# Patient Record
Sex: MALE | URBAN - NONMETROPOLITAN AREA
[De-identification: names, ages, dates, MRNs, and addresses within clinical notes are randomized per-mention and may not be internally consistent; named-entity substitution may affect disease eponyms.]

---

## 2017-08-02 ENCOUNTER — APPOINTMENT (OUTPATIENT)
Age: 82
Setting detail: DERMATOLOGY
End: 2017-08-02

## 2017-08-02 DIAGNOSIS — L85.3 XEROSIS CUTIS: ICD-10-CM

## 2017-08-02 DIAGNOSIS — D18.0 HEMANGIOMA: ICD-10-CM

## 2017-08-02 DIAGNOSIS — L81.4 OTHER MELANIN HYPERPIGMENTATION: ICD-10-CM

## 2017-08-02 DIAGNOSIS — L82.1 OTHER SEBORRHEIC KERATOSIS: ICD-10-CM

## 2017-08-02 PROBLEM — D18.01 HEMANGIOMA OF SKIN AND SUBCUTANEOUS TISSUE: Status: ACTIVE | Noted: 2017-08-02

## 2017-08-02 PROCEDURE — OTHER REASSURANCE: OTHER

## 2017-08-02 PROCEDURE — OTHER PRESCRIPTION: OTHER

## 2017-08-02 PROCEDURE — OTHER MEDICATION COUNSELING: OTHER

## 2017-08-02 PROCEDURE — OTHER COUNSELING: OTHER

## 2017-08-02 PROCEDURE — OTHER PATIENT SPECIFIC COUNSELING: OTHER

## 2017-08-02 PROCEDURE — 99213 OFFICE O/P EST LOW 20 MIN: CPT

## 2017-08-02 RX ORDER — TRIAMCINOLONE ACETONIDE 1 MG/G
CREAM TOPICAL
Qty: 1 | Refills: 0 | Status: ERX | COMMUNITY
Start: 2017-08-02

## 2017-08-02 ASSESSMENT — LOCATION SIMPLE DESCRIPTION DERM
LOCATION SIMPLE: ABDOMEN
LOCATION SIMPLE: CHEST
LOCATION SIMPLE: LEFT CHEEK
LOCATION SIMPLE: RIGHT UPPER BACK
LOCATION SIMPLE: CHEST
LOCATION SIMPLE: RIGHT UPPER ARM
LOCATION SIMPLE: RIGHT UPPER BACK
LOCATION SIMPLE: LEFT UPPER ARM

## 2017-08-02 ASSESSMENT — LOCATION ZONE DERM
LOCATION ZONE: TRUNK
LOCATION ZONE: ARM
LOCATION ZONE: TRUNK
LOCATION ZONE: FACE

## 2017-08-02 ASSESSMENT — LOCATION DETAILED DESCRIPTION DERM
LOCATION DETAILED: RIGHT MID-UPPER BACK
LOCATION DETAILED: RIGHT PROXIMAL POSTERIOR UPPER ARM
LOCATION DETAILED: LEFT SUPERIOR MEDIAL BUCCAL CHEEK
LOCATION DETAILED: LEFT DISTAL POSTERIOR UPPER ARM
LOCATION DETAILED: RIGHT SUPERIOR MEDIAL UPPER BACK
LOCATION DETAILED: EPIGASTRIC SKIN
LOCATION DETAILED: LEFT ANTERIOR DISTAL UPPER ARM
LOCATION DETAILED: RIGHT SUPERIOR MEDIAL UPPER BACK
LOCATION DETAILED: RIGHT ANTERIOR DISTAL UPPER ARM
LOCATION DETAILED: LEFT MEDIAL SUPERIOR CHEST
LOCATION DETAILED: LEFT PROXIMAL POSTERIOR UPPER ARM
LOCATION DETAILED: LEFT INFERIOR CENTRAL MALAR CHEEK
LOCATION DETAILED: STERNUM
LOCATION DETAILED: RIGHT DISTAL POSTERIOR UPPER ARM
LOCATION DETAILED: RIGHT ANTERIOR PROXIMAL UPPER ARM
LOCATION DETAILED: RIGHT MEDIAL UPPER BACK

## 2017-08-02 NOTE — HPI: SKIN LESION
How Severe Is Your Skin Lesion?: mild
Has Your Skin Lesion Been Treated?: been treated
Is This A New Presentation, Or A Follow-Up?: Skin Lesions
Additional History: Scratching til bleeding
Is This A New Presentation, Or A Follow-Up?: Skin Lesion

## 2018-08-24 ENCOUNTER — HOSPITAL ENCOUNTER (OUTPATIENT)
Dept: HOSPITAL 59 - ER | Age: 83
Setting detail: OBSERVATION
LOS: 3 days | Discharge: NURSING, REHAB OR LONG-TERM CARE | End: 2018-08-27
Attending: INTERNAL MEDICINE | Admitting: INTERNAL MEDICINE
Payer: MEDICARE

## 2018-08-24 DIAGNOSIS — K21.9: ICD-10-CM

## 2018-08-24 DIAGNOSIS — J18.9: ICD-10-CM

## 2018-08-24 DIAGNOSIS — I10: ICD-10-CM

## 2018-08-24 DIAGNOSIS — Z86.73: ICD-10-CM

## 2018-08-24 DIAGNOSIS — Z66: ICD-10-CM

## 2018-08-24 DIAGNOSIS — J44.9: ICD-10-CM

## 2018-08-24 DIAGNOSIS — F03.90: ICD-10-CM

## 2018-08-24 DIAGNOSIS — I25.2: ICD-10-CM

## 2018-08-24 DIAGNOSIS — R07.9: ICD-10-CM

## 2018-08-24 DIAGNOSIS — Z85.01: ICD-10-CM

## 2018-08-24 DIAGNOSIS — Z95.5: ICD-10-CM

## 2018-08-24 DIAGNOSIS — Z87.891: ICD-10-CM

## 2018-08-24 DIAGNOSIS — N28.9: ICD-10-CM

## 2018-08-24 DIAGNOSIS — I25.10: ICD-10-CM

## 2018-08-24 DIAGNOSIS — I50.9: Primary | ICD-10-CM

## 2018-08-24 DIAGNOSIS — Z95.1: ICD-10-CM

## 2018-08-24 LAB
ANION GAP SERPL CALC-SCNC: 9 MMOL/L (ref 7–16)
APTT BLD: 40.5 SECONDS (ref 24.5–39.1)
BASOPHILS NFR BLD: 0.3 % (ref 0–6)
BUN SERPL-MCNC: 24 MG/DL (ref 8–23)
CK MB SERPL-MCNC: 1.7 NG/ML (ref ?–6.73)
CK MB SERPL-MCNC: 2 NG/ML (ref ?–6.73)
CK MB SERPL-MCNC: 2.5 NG/ML (ref ?–6.73)
CO2 SERPL-SCNC: 31 MMOL/L (ref 22–29)
CREAT SERPL-MCNC: 1.3 MG/DL (ref 0.7–1.2)
CREATINE PHOSPHOKINASE: 31 U/L (ref 39–308)
EOSINOPHIL NFR BLD: 3.1 % (ref 0–6)
ERYTHROCYTE [DISTWIDTH] IN BLOOD BY AUTOMATED COUNT: 14.7 % (ref 11.5–14.5)
EST GLOMERULAR FILTRATION RATE: 56 ML/MIN
GLUCOSE SERPL-MCNC: 136 MG/DL (ref 74–109)
GRANULOCYTES NFR BLD: 74.3 % (ref 47–80)
HCT VFR BLD CALC: 39.8 % (ref 42–52)
HGB BLD-MCNC: 12.1 GM/DL (ref 14–18)
INR PPP: 1.5
LYMPHOCYTES NFR BLD AUTO: 14.8 % (ref 16–45)
MCH RBC QN AUTO: 24.6 PG (ref 27–33)
MCHC RBC AUTO-ENTMCNC: 30.4 G/DL (ref 32–36)
MCV RBC AUTO: 81.2 FL (ref 81–97)
MONOCYTES NFR BLD: 7.5 % (ref 0–9)
PLATELET # BLD: 168 K/UL (ref 130–400)
PMV BLD AUTO: 10.2 FL (ref 7.4–10.4)
PROTHROMBIN TIME: 14.5 SECONDS (ref 9.5–12.1)
RBC # BLD AUTO: 4.9 M/UL (ref 4.4–5.7)
WBC # BLD AUTO: 7.1 K/UL (ref 4.2–12.2)

## 2018-08-24 PROCEDURE — 96374 THER/PROPH/DIAG INJ IV PUSH: CPT

## 2018-08-24 PROCEDURE — 80048 BASIC METABOLIC PNL TOTAL CA: CPT

## 2018-08-24 PROCEDURE — 99220: CPT

## 2018-08-24 PROCEDURE — 71046 X-RAY EXAM CHEST 2 VIEWS: CPT

## 2018-08-24 PROCEDURE — 85025 COMPLETE CBC W/AUTO DIFF WBC: CPT

## 2018-08-24 PROCEDURE — 99285 EMERGENCY DEPT VISIT HI MDM: CPT

## 2018-08-24 PROCEDURE — 99226: CPT

## 2018-08-24 PROCEDURE — 99217: CPT

## 2018-08-24 PROCEDURE — 83880 ASSAY OF NATRIURETIC PEPTIDE: CPT

## 2018-08-24 PROCEDURE — 94760 N-INVAS EAR/PLS OXIMETRY 1: CPT

## 2018-08-24 PROCEDURE — 93010 ELECTROCARDIOGRAM REPORT: CPT

## 2018-08-24 PROCEDURE — 94640 AIRWAY INHALATION TREATMENT: CPT

## 2018-08-24 PROCEDURE — 84484 ASSAY OF TROPONIN QUANT: CPT

## 2018-08-24 PROCEDURE — 85730 THROMBOPLASTIN TIME PARTIAL: CPT

## 2018-08-24 PROCEDURE — 93005 ELECTROCARDIOGRAM TRACING: CPT

## 2018-08-24 PROCEDURE — 82553 CREATINE MB FRACTION: CPT

## 2018-08-24 PROCEDURE — 82550 ASSAY OF CK (CPK): CPT

## 2018-08-24 PROCEDURE — 94761 N-INVAS EAR/PLS OXIMETRY MLT: CPT

## 2018-08-24 PROCEDURE — 85610 PROTHROMBIN TIME: CPT

## 2018-08-24 RX ADMIN — CARVEDILOL SCH MG: 3.12 TABLET, FILM COATED ORAL at 09:36

## 2018-08-24 RX ADMIN — DONEPEZIL HYDROCHLORIDE SCH MG: 5 TABLET, FILM COATED ORAL at 09:36

## 2018-08-24 RX ADMIN — FUROSEMIDE SCH MG: 10 INJECTION, SOLUTION INTRAMUSCULAR; INTRAVENOUS at 09:36

## 2018-08-24 RX ADMIN — ATORVASTATIN CALCIUM SCH MG: 20 TABLET, FILM COATED ORAL at 22:07

## 2018-08-24 RX ADMIN — ISOSORBIDE MONONITRATE SCH MG: 30 TABLET, FILM COATED, EXTENDED RELEASE ORAL at 09:35

## 2018-08-24 RX ADMIN — ASPIRIN TAB DELAYED RELEASE 81 MG SCH MG: 81 TABLET DELAYED RESPONSE at 09:35

## 2018-08-24 RX ADMIN — AMLODIPINE BESYLATE SCH MG: 5 TABLET ORAL at 09:35

## 2018-08-24 RX ADMIN — RIVAROXABAN SCH MG: 20 TABLET, FILM COATED ORAL at 09:35

## 2018-08-24 RX ADMIN — LISINOPRIL SCH MG: 20 TABLET ORAL at 09:36

## 2018-08-24 RX ADMIN — CARVEDILOL SCH MG: 3.12 TABLET, FILM COATED ORAL at 22:06

## 2018-08-24 RX ADMIN — MEMANTINE HYDROCHLORIDE SCH MG: 10 TABLET ORAL at 09:35

## 2018-08-24 RX ADMIN — ACETAMINOPHEN PRN MG: 500 TABLET ORAL at 22:08

## 2018-08-24 RX ADMIN — FUROSEMIDE SCH MG: 10 INJECTION, SOLUTION INTRAMUSCULAR; INTRAVENOUS at 16:12

## 2018-08-24 RX ADMIN — PANTOPRAZOLE SODIUM SCH MG: 40 TABLET, DELAYED RELEASE ORAL at 06:56

## 2018-08-24 RX ADMIN — MEMANTINE HYDROCHLORIDE SCH MG: 10 TABLET ORAL at 22:08

## 2018-08-24 RX ADMIN — CARVEDILOL SCH MG: 12.5 TABLET, FILM COATED ORAL at 09:36

## 2018-08-24 NOTE — EMERGENCY DEPARTMENT RECORD
History of Present Illness





- General


Chief Complaint: Cough


Stated Complaint: COUGH/CHEST PAIN


Time Seen by Provider: 18 01:08


Source: Patient


Mode of Arrival: Ambulatory


Limitations: No limitations





- History of Present Illness


Initial Comments: 


The patient is here due to a one week hx of cough and congestion. Now this 

evening he started having CP only when coughing. He denies any new SOB, fever, 

or sputum production. The patient had been to [facility] about a month ago for 

similar issues and was diagnosed with mild CHF.





MD Complaint: Cough


Onset/Timin


-: Week(s)





- Related Data


 Home Medications











 Medication  Instructions  Recorded  Confirmed  Last Taken


 


Amlodipine Besylate [Norvasc] 2.5 mg PO DAILY 18 Unknown


 


Aspirin [Aspir-Low] 81 mg PO DAILY 18 Unknown


 


Atorvastatin Calcium [Lipitor] 10 mg PO QHS 18 Unknown


 


Carvedilol 25 mg PO DAILY 18 Unknown


 


Carvedilol [Coreg] 6.25 mg PO BID 18 Unknown


 


Donepezil HCl [Aricept] 5 mg PO DAILY 18 Unknown


 


Furosemide [Lasix] 20 mg PO DAILY 18 Unknown


 


Isosorbide Mononitrate [Imdur] 30 mg PO DAILY 18 Unknown


 


Lisinopril 20 mg PO DAILY 18 Unknown


 


Memantine HCl [Namenda] 10 mg PO BID 18 Unknown


 


Multivit-Min/FA/Lycopen/Lutein 1 each PO DAILY 18 Unknown





[Centrum Silver Men Tablet]    


 


Omeprazole 20 mg PO DAILY 18 Unknown


 


Rivaroxaban [Xarelto] 20 mg PO DAILY 18 Unknown


 


Tramadol HCl 50 mg PO Q6H PRN 18 Unknown











 Allergies











Allergy/AdvReac Type Severity Reaction Status Date / Time


 


Cephalosporins Allergy  pt unsure Verified 18 01:29


 


clonidine Allergy  RASH Verified 18 01:29


 


lovastatin Allergy  pt unsure Verified 18 01:29


 


oxaprozin AdvReac  fall asleep Verified 18 01:29


 


rofecoxib [From Vioxx] AdvReac  leg pain Verified 18 01:29


 


simvastatin [From Zocor] AdvReac  myalgia Verified 18 01:29














Travel Screening





- Travel/Exposure Within Last 30 Days


Have you traveled within the last 30 days?: No





Review of Systems


Constitutional: Reports: Malaise.  Denies: Chills, Fever


Eyes: Denies: Eye discharge


ENT: Reports: Congestion


Respiratory: Reports: Cough.  Denies: Dyspnea


Cardiovascular: Reports: Chest pain (with cough only.).  Denies: Arrhythmia


Endocrine: Reports: Fatigue


Gastrointestinal: Denies: Abdominal pain


Genitourinary: Denies: Dysuria


Musculoskeletal: Denies: Arthralgia, Back pain


Skin: Denies: Bruising





Past Medical History





- SOCIAL HISTORY


Smoking Status: Never smoker


Alcohol Use: None


Drug Use: None





- RESPIRATORY


Hx Respiratory Disorders: Yes


Hx COPD: Yes





- CARDIOVASCULAR


Hx Cardio Disorders: Yes


Hx CHF: Yes


Hx Heart Attack: Yes





- NEURO


Hx Neuro Disorders: Yes


Hx Dementia: Yes





- GI


Hx GI Disorders: Yes


Hx Reflux: Yes





- 


Hx Genitourinary Disorders: No





- ENDOCRINE


Hx Endocrine Disorders: No





- MUSCULOSKELETAL


Hx Musculoskeletal Disorders: Yes


Hx Arthritis: Yes





- PSYCH


Hx Psych Problems: No





- HEMATOLOGY/ONCOLOGY


Hx Hematology/Oncology Disorders: Yes


Hx Cancer: Yes (Esophageal)


Hx Chemotherapy: No


Hx Radiation Therapy: No





Family Medical History


Any Significant Family History?: No





Physical Exam





- General


General Appearance: Alert, Oriented x3, Cooperative, No acute distress





- Head


Head exam: Atraumatic, Normocephalic, Normal inspection





- Eye


Eye exam: Normal appearance





- ENT


Throat exam: Normal inspection.  negative: Tonsillar erythema, Tonsillar exudate





- Neck


Neck exam: Normal inspection, Full ROM.  negative: Tenderness





- Respiratory


Respiratory exam: Normal lung sounds bilaterally.  negative: Respiratory 

distress





- Cardiovascular


Cardiovascular Exam: Regular rate, Normal rhythm, Normal heart sounds





- GI/Abdominal


GI/Abdominal exam: Soft, Normal bowel sounds.  negative: Tenderness





- Extremities


Extremities exam: Normal inspection, Full ROM, Normal capillary refill.  

negative: Tenderness





- Back


Back exam: Reports: Normal inspection





- Neurological


Neurological exam: Alert, Oriented X3.  negative: Altered, Motor sensory deficit





- Skin


Skin exam: negative: Rash





Course





 Vital Signs











  18





  00:45


 


Temperature 97.6 F


 


Pulse Rate 76


 


Respiratory 20





Rate 


 


Blood Pressure 188/86


 


Pulse Ox 97














- Reevaluation(s)


Reevaluation #1: 


The patient is doing OK at this time and is still coughing. His CXR clearly 

appears to be CHF and his BNP was only about 1000 last month and now is over 

5000. We will admit him to the hospital for diuresis and also further cardiac 

monitoring. I again asked him about his CP and he states he ONLY has pain when 

coughing.


18 01:49





Reevaluation #2: 


The patient is doing very well at this time. He is resting comfortably in no 

distress or RAYRAY now.


18 02:26








Medical Decision Making





- Data Complexity


MDM Data: Labs Ordered and/or Reviewed, X-Ray Ordered and/or Reviewed, EKG 

Ordered and/or Reviewed





- Lab Data


Result diagrams: 


 18 00:34





 18 00:34





- EKG Data


-: EKG Interpreted by Me (NSR at 76, LBBB.)


EKG: Unchanged From Previous (No change from 3/3/18.)





- Radiology Data


Radiology results: Report reviewed (CXR: CHF with effusions.)





Disposition


Disposition: Admit


Clinical Impression: 


CHF (congestive heart failure)


Qualifiers:


 Heart failure type: unspecified Heart failure chronicity: unspecified 

Qualified Code(s): I50.9 - Heart failure, unspecified





Disposition: Still a Patient at [facility]


Decision to Admit: Admit from ER


Decision to Admit Date: 18


Decision to Admit Time: 01:51


Accepting Physician: Jose David Marques Discussed w/Accepting Physician: 01:51


Condition: (2) Stable


Time of Disposition: 01:52





Quality





- Quality Measures


Quality Measures: N/A





- Blood Pressure Screening


View Details: Yes


Does Patient Have Any of the Following: Active Dx of HTN


Blood Pressure Classification: Pre-Hypertensive BP Reading


Systolic Measurement: 188


Diastolic Measurement: 86


Screening for High Blood Pressure: Patient Exclusion, Hx of HTN []

## 2018-08-24 NOTE — RADIOLOGY REPORT
EXAM:  CHEST, TWO VIEWS



HISTORY:  DIFFICULTY IN BREATHING. 



TECHNIQUE:  Frontal and lateral views of the chest were performed.  



FINDINGS:  The heart size is normal.  Postop surgical change.  The lungs are 
hyperinflated.  Bilateral lower lobe air space opacities with pleural effusion.
  



IMPRESSION:  

BILATERAL LOWER LOBE AIR SPACE OPACITIES WITH PLEURAL EFFUSION.  



JOB NUMBER:  [id number]
MTDD

## 2018-08-24 NOTE — HISTORY & PHYSICAL
History of Present Illness





- Date of Service


Date of Service for History & Physical: 08/24/18





- History of Present Illness


Admitting Diagnosis: 1. CHF


History of Present Illness: 


Mr. Edouard is a 84 y/o male [facility] assisted living resident with history 

of congestive heart failure who presents with complaint of increasing shortness 

of breath and persistent cough over the past week. The patient reports that he 

then began to have chest pain but only when he coughs. He says that he becomes 

short of breath when he climbs stairs but can walk around using his walker 

without becoming winded. The patient reports compliance with his medications 

and has not been eating excessive amounts of salty foods or had an increase in 

fluid intake. He denies radiation of his pain or palpitations. He has a history 

of coronary artery disease with CABG x 3, CVA, seizures and malignant neoplasm 

of the esophagus. 





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 

PACs throughout. Chest xray shows evidence of congestion and his BNP > 5000. 

The patient is admitted for IV diuresis and optimization of heart failure 

medication. 








PCP: Dr. Michael  





Travel Screening





- Travel/Exposure Within Last 30 Days


Have you traveled within the last 30 days?: No





- Travel/Exposure Within Last Year


Have you traveled outside the [place] in the last year?: No





- Additonal Travel Details


Have you been exposed to anyone with a communicable illness?: No





- Travel Symptoms


Symptom Screening: None





Review of Systems


Constitutional: Reports: Malaise.  Denies: Chills, Fever


Eyes: Denies: Eye discharge


ENT: Reports: Congestion


Respiratory: Reports: Cough.  Denies: Dyspnea


Cardiovascular: Reports: Chest pain (with cough only.).  Denies: Arrhythmia


Endocrine: Reports: Fatigue


Gastrointestinal: Denies: Abdominal pain


Genitourinary: Denies: Dysuria


Musculoskeletal: Denies: Arthralgia, Back pain


Skin: Denies: Bruising





Past Medical History





- SOCIAL HISTORY


Smoking Status: Former smoker


Alcohol Use: Rare


Drug Use: None





- RESPIRATORY


Hx Respiratory Disorders: Yes


Hx Asthma: No


Hx Bronchitis: No


Hx COPD: No


Hx Dyspnea: Yes


Hx Pneumonia: Yes


Hx Pulmonary Embolism: No


Hx Sleep Apnea: Yes


Hx Tuberculosis: No


Hx of CPAP: Yes





- CARDIOVASCULAR


Hx Cardio Disorders: Yes


Hx Abnormal EKG: Yes


Hx Cardiac Cath: Yes


Hx Chest Pain: No


Hx CHF: Yes


Hx Deep Vein Thrombosis: No


Hx Edema: No


Hx Heart Attack: Yes


Hx Hypertension: Yes


Hx Hypotension: No


Hx Irregular Heartbeat: Yes


Hx Palpitations: No


Hx Pacemaker/Defib: No


Hx Vascular Disease: No





- NEURO


Hx Neuro Disorders: Yes


Hx Brain Tumor: No


Hx CVA: Yes (2017)


Hx Dementia: Yes


Hx Dizziness: Yes


Hx Headaches: No


Hx Neuropathy: No


Hx Parkinson's Disease: No


Hx Seizures: No


Hx Speech Problem: No


Hx TIA: Yes





- GI


Hx GI Disorders: Yes


Hx Abdominal Pain: No


Hx Celiac Disease: No


Hx Crohn's Disease: No


Hx Diverticulitis: No


Hx GI Bleed: No


Hx Reflux: Yes


Hx Hepatitis/Jaundice: No


Hx Hiatal Hernia: Yes


Hx Irritable Bowel: Yes


Hx Liver Disease: No


Hx Nausea/Vomiting: No


Hx Obstructive Bowel: No


Hx Pancreatitis: No


Hx Rectal Bleeding: No


Hx Ulcer: Yes


Hx Wt Loss/Wt Gain: No


Hx of Polyps: No





- 


Hx Genitourinary Disorders: No


Hx Bladder Problem: No


Hx Dialysis: No


Hx Kidney Stones: No


Hx Prostate Problems: No


Hx Renal Disease: No


Hx UTI: No





- ENDOCRINE


Hx Endocrine Disorders: No


Hx Diabetes: No


Hx Thyroid Disease: No





- MUSCULOSKELETAL


Hx Musculoskeletal Disorders: Yes


Hx Arthritis: Yes


Hx Back Injury: No


Hx Fibromyalgia: No


Hx Gout: No


Hx Musculoskeletal Disease: No


Hx Osteoporosis: No





- PSYCH


Hx Psych Problems: No


Hx Anxiety: No


Hx Behavior Problems: No


Hx Depression: No


Hx Emotional Abuse: No


Hx Sexual Abuse: No


Hx Suicide Attempt: No


Major Depressive Episode: No


Feelings of Hopelessness: No





- HEMATOLOGY/ONCOLOGY


Hx Hematology/Oncology Disorders: Yes


Hx Anemia: No


Hx Blood Disorders: No


Hx Bruising: No


Hx Cancer: Yes (Esophageal)


Hx Chemotherapy: No


Hx Radiation Therapy: No


Hx Clotting Problems: No


Hx Sickle Cell Disease: No


Hx Unexplained Bleeding: No


Hx Blood Transfusions: No


Hx Blood Transfusion Reaction: No





Family Medical History


Any Significant Family History?: No


Hx Cancer: Mother, Brother/Sister


Hx Diabetes: Brother/Sister


Hx Heart Disease: Father, Mother, Brother/Sister


Hx HTN: Mother, Brother/Sister


Hx Stroke: Mother





H&P Meds/Allergies





- Allergies


Allergies: 


 Allergies











Allergy/AdvReac Type Severity Reaction Status Date / Time


 


Cephalosporins Allergy  pt unsure Verified 08/24/18 01:29


 


clonidine Allergy  RASH Verified 08/24/18 01:29


 


lovastatin Allergy  pt unsure Verified 08/24/18 01:29


 


oxaprozin AdvReac  fall asleep Verified 08/24/18 01:29


 


rofecoxib [From Vioxx] AdvReac  leg pain Verified 08/24/18 01:29


 


simvastatin [From Zocor] AdvReac  myalgia Verified 08/24/18 01:29














- Home Medications


 Home Medications











 Medication  Instructions  Recorded  Confirmed  Last Taken


 


Amlodipine Besylate [Norvasc] 2.5 mg PO DAILY 08/24/18 08/24/18 Unknown


 


Aspirin [Aspir-Low] 81 mg PO DAILY 08/24/18 08/24/18 Unknown


 


Atorvastatin Calcium [Lipitor] 10 mg PO QHS 08/24/18 08/24/18 Unknown


 


Carvedilol 25 mg PO DAILY 08/24/18 08/24/18 Unknown


 


Carvedilol [Coreg] 6.25 mg PO BID 08/24/18 08/24/18 Unknown


 


Donepezil HCl [Aricept] 5 mg PO DAILY 08/24/18 08/24/18 Unknown


 


Furosemide [Lasix] 20 mg PO DAILY 08/24/18 08/24/18 Unknown


 


Isosorbide Mononitrate [Imdur] 30 mg PO DAILY 08/24/18 08/24/18 Unknown


 


Lisinopril 20 mg PO DAILY 08/24/18 08/24/18 Unknown


 


Memantine HCl [Namenda] 10 mg PO BID 08/24/18 08/24/18 Unknown


 


Multivit-Min/FA/Lycopen/Lutein 1 each PO DAILY 08/24/18 08/24/18 Unknown





[Centrum Silver Men Tablet]    


 


Omeprazole 20 mg PO DAILY 08/24/18 08/24/18 Unknown


 


Rivaroxaban [Xarelto] 20 mg PO DAILY 08/24/18 08/24/18 Unknown


 


Tramadol HCl 50 mg PO Q6H PRN 08/24/18 08/24/18 Unknown














- Active Medications


Active Medications: 


 Current Medications





Acetaminophen (Tylenol 500mg Tab)  500 mg PO Q6H PRN


   PRN Reason: PAIN - MILD(1-4)/FEVER


Amlodipine Besylate (Norvasc)  2.5 mg PO DAILY [facility]


Aspirin (Ecotrin (Ec))  81 mg PO DAILY [facility]


Carvedilol (Coreg)  25 mg PO DAILY [facility]


Carvedilol (Coreg)  6.25 mg PO BID [facility]


Donepezil HCl (Aricept)  5 mg PO DAILY [facility]


Furosemide (Lasix Iv)  20 mg IVP BIDDIUR [facility]


Isosorbide Mononitrate (Imdur)  30 mg PO DAILY [facility]


Lisinopril (Zestril)  20 mg PO DAILY [facility]


Memantine (Namenda)  10 mg PO BID [facility]


Nitroglycerin (Nitrostat 0.4mg)  0.4 mg SL Q5MIN PRN


   PRN Reason: CHEST PAIN


Non-Formulary Medication (Atorvastatin Calcium [Lipitor])  10 mg PO QHS [facility]


Pantoprazole Sodium (Protonix)  40 mg PO DAILYAC [facility]


   Last Admin: 08/24/18 06:56 Dose:  40 mg


Rivaroxaban (Xarelto)  20 mg PO DAILY [facility]


Tramadol HCl (Ultram)  50 mg PO Q6H PRN


   PRN Reason: PAIN - MOD TO SEVERE (5-10)











Physical Exam





- Vital Signs


Vital Signs: 


 Vital Signs - Last 24 Hrs











  Temp Pulse Pulse Resp BP BP Pulse Ox


 


 08/24/18 06:00   72   18    96


 


 08/24/18 04:00  97.6 F   66  18   155/72  100


 


 08/24/18 02:39  97.4 F L   74  16   120/64  99


 


 08/24/18 02:30   74   20  144/80   98


 


 08/24/18 01:47    77  24   133/71  98


 


 08/24/18 00:45  97.6 F  76   20  188/86   97














- General


General Appearance: Alert, Oriented x3, Cooperative, No acute distress


Limitations: No limitations





- Head


Head exam: Atraumatic, Normocephalic, Normal inspection





- Eye


Eye exam: Normal appearance





- ENT


Throat exam: Normal inspection.  negative: Tonsillar erythema, Tonsillar exudate





- Neck


Neck exam: Normal inspection, Full ROM.  negative: Tenderness





- Respiratory


Respiratory exam: Normal lung sounds bilaterally, Rales (of bilateral posterior 

lung bases. ), Other.  negative: Respiratory distress





- Cardiovascular


Cardiovascular Exam: Regular rate, Normal rhythm, Normal heart sounds


Peripheral Pulses: 2+: Radial (R), Radial (L), Dorsalis Pedis (R), Dorsalis 

Pedis (L)





- GI/Abdominal


GI/Abdominal exam: Soft, Normal bowel sounds.  negative: Tenderness





- Extremities


Extremities exam: Normal inspection, Full ROM, Normal capillary refill.  

negative: Tenderness





- Back


Back exam: Reports: Normal inspection





- Neurological


Neurological exam: Alert, Oriented X3.  negative: Altered, Motor sensory deficit





- Skin


Skin exam: negative: Rash





Results





- Labs


Result Diagrams: 


 08/24/18 00:34





 08/24/18 00:34


Labs Last 24 Hours: 


 Laboratory Results - last 24 hr











  08/24/18 08/24/18 08/24/18





  00:34 00:34 00:34


 


WBC  7.1  


 


RBC  4.90  


 


Hgb  12.1 L  


 


Hct  39.8 L  


 


MCV  81.2  


 


MCH  24.6 L  


 


MCHC  30.4 L  


 


RDW  14.7 H  


 


Plt Count  168  


 


MPV  10.2  


 


Gran %  74.3  


 


Lymphocytes %  14.8 L  


 


Monocytes %  7.5  


 


Eosinophils %  3.1  


 


Basophils %  0.3  


 


PT   14.5 H 


 


INR   1.5 


 


APTT   40.5 H 


 


Sodium    140


 


Potassium    4.5


 


Chloride    100


 


Carbon Dioxide    31.0 H


 


Anion Gap    9.0


 


BUN    24 H


 


Creatinine    1.3 H


 


Estimated GFR    56


 


Random Glucose    136 H


 


Calcium    8.9


 


Creatine Kinase    31 L


 


CK-MB (CK-2)    2.0


 


Troponin T    < 0.010


 


NT-Pro-B Natriuret Pep    5047.00 H














  08/24/18 08/24/18





  01:15 06:15


 


WBC  


 


RBC  


 


Hgb  


 


Hct  


 


MCV  


 


MCH  


 


MCHC  


 


RDW  


 


Plt Count  


 


MPV  


 


Gran %  


 


Lymphocytes %  


 


Monocytes %  


 


Eosinophils %  


 


Basophils %  


 


PT  


 


INR  


 


APTT  


 


Sodium  


 


Potassium  


 


Chloride  


 


Carbon Dioxide  


 


Anion Gap  


 


BUN  


 


Creatinine  


 


Estimated GFR  


 


Random Glucose  


 


Calcium  


 


Creatine Kinase  


 


CK-MB (CK-2)   2.5


 


Troponin T   < 0.010


 


NT-Pro-B Natriuret Pep  Cancelled 














VTE H&P Assessment





- Risk for VTE


Risk for VTE: Yes


Risk Level: High


Risk Assessment Date: 08/24/18


Risk Assessment Time: 09:57


VTE Orders Placed or Will Be Placed: Yes





Plan





- Detailed Diagnosis and Plan


(1) Acute exacerbation of congestive heart failure


Current Visit: Yes   Status: Acute   Base Code: I50.9 - HEART FAILURE, 

UNSPECIFIED   Comment: 


8/24/18:


- CXR: evidence of congestion, BNP > 5000


- IV Lasix 20mg BID, resume Coreg 25mg and Coreg 6.25mg BID?, Imdur 30mg, and 

Lisinopril 20mg daily,Atorvastatin 10mg, ASA 81mg.


- repeat basic metabolic panel, daily weights, I/Os 


- continuous cardiac monitoring   





(2) HTN (hypertension)


Current Visit: Yes   Status: Acute   Base Code: I10 - ESSENTIAL (PRIMARY) 

HYPERTENSION   Comment: 


8/24/18:


- /72, HR 72.


- resume Amlodipine 2.5mg, Lisinopril 20mg daily.    





(3) CAD (coronary artery disease)


Current Visit: Yes   Status: Acute   Base Code: I25.10 - ATHSCL HEART DISEASE 

OF NATIVE CORONARY ARTERY W/O ANG PCTRS   Comment: 


8/24/18:


- old LBBB, Qtc prolongation, NSR, hx of CABG x 3 


- resumes ASA, Coreg, Lisinopril, Atorvastatin. 


- cont cardiac monitor    





(4) DVT prophylaxis


Current Visit: Yes   Status: Acute   Base Code: [id number] -    Comment: 


8/24/18:


- pt on Xarelto but we are unclear if he has a fib, or hx of dvt. There is no 

documentation of either of these conditions.    





(5) DNR (do not resuscitate)


Current Visit: Yes   Status: Acute   Base Code: Z66 - DO NOT RESUSCITATE   

Comment: 


8/24/18:


- pt is DNR on this admission.   





- Disposition


D/C tomorrow if improved

## 2018-08-25 LAB
ANION GAP SERPL CALC-SCNC: 11 MMOL/L (ref 7–16)
BASOPHILS NFR BLD: 0.3 % (ref 0–6)
BUN SERPL-MCNC: 37 MG/DL (ref 8–23)
CO2 SERPL-SCNC: 30 MMOL/L (ref 22–29)
CREAT SERPL-MCNC: 1.6 MG/DL (ref 0.7–1.2)
EOSINOPHIL NFR BLD: 5.5 % (ref 0–6)
ERYTHROCYTE [DISTWIDTH] IN BLOOD BY AUTOMATED COUNT: 14.8 % (ref 11.5–14.5)
EST GLOMERULAR FILTRATION RATE: 44 ML/MIN
GLUCOSE SERPL-MCNC: 114 MG/DL (ref 74–109)
GRANULOCYTES NFR BLD: 64.8 % (ref 47–80)
HCT VFR BLD CALC: 35.8 % (ref 42–52)
HGB BLD-MCNC: 10.8 GM/DL (ref 14–18)
LYMPHOCYTES NFR BLD AUTO: 19.3 % (ref 16–45)
MCH RBC QN AUTO: 24.3 PG (ref 27–33)
MCHC RBC AUTO-ENTMCNC: 30.2 G/DL (ref 32–36)
MCV RBC AUTO: 80.8 FL (ref 81–97)
MONOCYTES NFR BLD: 10.1 % (ref 0–9)
PLATELET # BLD: 160 K/UL (ref 130–400)
PMV BLD AUTO: 9.6 FL (ref 7.4–10.4)
RBC # BLD AUTO: 4.43 M/UL (ref 4.4–5.7)
WBC # BLD AUTO: 7.4 K/UL (ref 4.2–12.2)

## 2018-08-25 RX ADMIN — CARVEDILOL SCH MG: 3.12 TABLET, FILM COATED ORAL at 22:09

## 2018-08-25 RX ADMIN — CARVEDILOL SCH MG: 3.12 TABLET, FILM COATED ORAL at 09:32

## 2018-08-25 RX ADMIN — FUROSEMIDE SCH: 20 TABLET ORAL at 16:33

## 2018-08-25 RX ADMIN — DONEPEZIL HYDROCHLORIDE SCH MG: 5 TABLET, FILM COATED ORAL at 09:36

## 2018-08-25 RX ADMIN — AMLODIPINE BESYLATE SCH MG: 5 TABLET ORAL at 09:37

## 2018-08-25 RX ADMIN — PANTOPRAZOLE SODIUM SCH MG: 40 TABLET, DELAYED RELEASE ORAL at 08:05

## 2018-08-25 RX ADMIN — MEMANTINE HYDROCHLORIDE SCH MG: 10 TABLET ORAL at 09:32

## 2018-08-25 RX ADMIN — ISOSORBIDE MONONITRATE SCH MG: 30 TABLET, FILM COATED, EXTENDED RELEASE ORAL at 09:36

## 2018-08-25 RX ADMIN — ATORVASTATIN CALCIUM SCH MG: 20 TABLET, FILM COATED ORAL at 22:10

## 2018-08-25 RX ADMIN — ACETAMINOPHEN PRN MG: 500 TABLET ORAL at 22:09

## 2018-08-25 RX ADMIN — ASPIRIN TAB DELAYED RELEASE 81 MG SCH MG: 81 TABLET DELAYED RESPONSE at 09:36

## 2018-08-25 RX ADMIN — LISINOPRIL SCH MG: 20 TABLET ORAL at 09:32

## 2018-08-25 RX ADMIN — RIVAROXABAN SCH MG: 20 TABLET, FILM COATED ORAL at 09:32

## 2018-08-25 RX ADMIN — CARVEDILOL SCH MG: 12.5 TABLET, FILM COATED ORAL at 09:32

## 2018-08-25 RX ADMIN — FUROSEMIDE SCH MG: 10 INJECTION, SOLUTION INTRAMUSCULAR; INTRAVENOUS at 09:37

## 2018-08-25 RX ADMIN — MEMANTINE HYDROCHLORIDE SCH MG: 10 TABLET ORAL at 22:11

## 2018-08-25 NOTE — PHYSICIAN PROGRESS NOTE
Subjective





- Date


Date of Physician Progress Note: 08/25/18





- Subjective


Subjective Comment: 


Patient alert and awake. He is able to get out of bed and ambulate  to restroom 

with assistance. He is maintaining sats > 94% w/o supplemtal oxygen. 





Objective





- Vital Signs


Vital Signs: 





 Vital Signs - Last 24 Hrs











  Temp Pulse Pulse Pulse Resp BP Pulse Ox


 


 08/25/18 09:00  98.9 F   60   20  138/89  97


 


 08/25/18 08:56      16   96


 


 08/25/18 08:26    68   16  


 


 08/25/18 05:00  97.6 F    62  18  136/88  99


 


 08/25/18 01:00  97.9 F    60  18  142/64  100


 


 08/24/18 20:00  97.6 F    70  18  102/58  99


 


 08/24/18 17:00  98.1 F    75  20  116/68  94 L


 


 08/24/18 13:00  98.3 F    69  18  83/45  97


 


 08/24/18 11:20   52 L    20   96














- General


General Appearance: Alert, Oriented x3, Cooperative, No acute distress


Limitations: No limitations





- Head


Head exam: Atraumatic, Normocephalic, Normal inspection





- Eye


Eye exam: Normal appearance





- ENT


Throat exam: Normal inspection.  negative: Tonsillar erythema, Tonsillar exudate





- Neck


Neck exam: Normal inspection, Full ROM.  negative: Tenderness





- Respiratory


Respiratory exam: Decreased breath sounds (on the left ), Rales (no rales this 

morning ), Other.  negative: Respiratory distress





- Cardiovascular


Cardiovascular Exam: Regular rate, Normal rhythm, Normal heart sounds


Peripheral Pulses: 2+: Radial (R), Radial (L), Dorsalis Pedis (R), Dorsalis 

Pedis (L)





- GI/Abdominal


GI/Abdominal exam: Soft, Normal bowel sounds.  negative: Tenderness





- Extremities


Extremities exam: Normal inspection, Full ROM, Normal capillary refill.  

negative: Tenderness





- Back


Back exam: Reports: Normal inspection





- Neurological


Neurological exam: Alert, Oriented X3.  negative: Altered, Motor sensory deficit





- Skin


Skin exam: negative: Rash





Assessment and Plan





- Assessment and Plan


(1) Acute on chronic renal insufficiency


Current Visit: Yes   Status: Acute   Base Code: N28.9 - DISORDER OF KIDNEY AND 

URETER, UNSPECIFIED; N18.9 - CHRONIC KIDNEY DISEASE, UNSPECIFIED   Comment: 


8/25/18:


- Bun/Cr 37/1.6, possibly due to diuresis. 


- Repeat electrolytes in the morning


- Lasix 20mg BID to PO BID daily.    





(2) Acute exacerbation of congestive heart failure


Current Visit: Yes   Status: Acute   Base Code: I50.9 - HEART FAILURE, 

UNSPECIFIED   Comment: 


8/25/18:


- CXR: evidence of congestion, BNP > 5000


- IV Lasix 20mg BID, resume Coreg 25mg and Coreg 6.25mg BID?, Imdur 30mg, and 

Lisinopril 20mg daily,Atorvastatin 10mg, ASA 81mg.


- repeat basic metabolic panel, daily weights, I/Os 


- continuous cardiac monitoring   





(3) HTN (hypertension)


Current Visit: Yes   Status: Acute   Base Code: I10 - ESSENTIAL (PRIMARY) 

HYPERTENSION   Comment: 


8/25/18:


- /88, HR 68


- resume Amlodipine 2.5mg, Lisinopril 20mg daily.    





(4) CAD (coronary artery disease)


Current Visit: Yes   Status: Acute   Base Code: I25.10 - ATHSCL HEART DISEASE 

OF NATIVE CORONARY ARTERY W/O ANG PCTRS   Comment: 


8/25/18:


- old LBBB, Qtc prolongation, NSR, hx of CABG x 3 


- resumes ASA, Coreg, Lisinopril, Atorvastatin. 


- cont cardiac monitor    





(5) DVT prophylaxis


Current Visit: Yes   Status: Acute   Base Code: NTL4212 -    Comment: 


8/25/18:


- pt on Xarelto but we are unclear if he has a fib, or hx of dvt. There is no 

documentation of either of these conditions.    





(6) DNR (do not resuscitate)


Current Visit: Yes   Status: Acute   Base Code: Z66 - DO NOT RESUSCITATE   

Comment: 


8/25/18:


- pt is DNR on this admission.   





- Disposition


Disposition: 


D/C back to [facility] living. Follow up with Dr. Michael within the 

week. 





Results





- Labs


Result Diagrams: 


 08/25/18 05:55





 08/25/18 05:55


Labs Last 24 Hours: 





 Laboratory Results - last 24 hr











  08/24/18 08/25/18 08/25/18





  14:12 05:55 05:55


 


WBC   7.4 


 


RBC   4.43 


 


Hgb   10.8 L 


 


Hct   35.8 L 


 


MCV   80.8 L 


 


MCH   24.3 L 


 


MCHC   30.2 L 


 


RDW   14.8 H 


 


Plt Count   160 


 


MPV   9.6 


 


Gran %   64.8 


 


Lymphocytes %   19.3 


 


Monocytes %   10.1 H 


 


Eosinophils %   5.5 


 


Basophils %   0.3 


 


Sodium    139


 


Potassium    4.2


 


Chloride    98


 


Carbon Dioxide    30.0 H


 


Anion Gap    11.0


 


BUN    37 H


 


Creatinine    1.6 H


 


Estimated GFR    44


 


Random Glucose    114 H


 


Calcium    8.9


 


CK-MB (CK-2)  1.7  


 


Troponin T  0.015 H  














DVT/PE Assessment





- Risk for VTE


Risk for VTE: No


Risk Level: High


Risk Assessment Date: 08/24/18


Risk Assessment Time: 09:57


VTE Orders Placed or Will Be Placed: Yes





- Active Medicaitons


Current Medications: 





 Current Medications





Acetaminophen (Tylenol 500mg Tab)  500 mg PO Q6H PRN


   PRN Reason: PAIN - MILD(1-4)/FEVER


   Last Admin: 08/24/18 22:08 Dose:  500 mg


Amlodipine Besylate (Norvasc)  2.5 mg PO DAILY [facility]


   Last Admin: 08/25/18 09:37 Dose:  2.5 mg


Aspirin (Ecotrin (Ec))  81 mg PO DAILY [facility]


   Last Admin: 08/25/18 09:36 Dose:  81 mg


Atorvastatin Calcium (Lipitor)  10 mg PO QHS [facility]


   Last Admin: 08/24/18 22:07 Dose:  10 mg


Carvedilol (Coreg)  25 mg PO DAILY [facility]


   Last Admin: 08/25/18 09:32 Dose:  25 mg


Carvedilol (Coreg)  6.25 mg PO BID [facility]


   Last Admin: 08/25/18 09:32 Dose:  6.25 mg


Donepezil HCl (Aricept)  5 mg PO DAILY [facility]


   Last Admin: 08/25/18 09:36 Dose:  5 mg


Furosemide (Lasix Iv)  20 mg IVP BIDDIUR [facility]


   Last Admin: 08/25/18 09:37 Dose:  20 mg


Isosorbide Mononitrate (Imdur)  30 mg PO DAILY [facility]


   Last Admin: 08/25/18 09:36 Dose:  30 mg


Lisinopril (Zestril)  20 mg PO DAILY [facility]


   Last Admin: 08/25/18 09:32 Dose:  20 mg


Memantine (Namenda)  10 mg PO BID [facility]


   Last Admin: 08/25/18 09:32 Dose:  10 mg


Nitroglycerin (Nitrostat 0.4mg)  0.4 mg SL Q5MIN PRN


   PRN Reason: CHEST PAIN


Pantoprazole Sodium (Protonix)  40 mg PO DAILYAC [facility]


   Last Admin: 08/25/18 08:05 Dose:  40 mg


Rivaroxaban (Xarelto)  20 mg PO DAILY [facility]


   Last Admin: 08/25/18 09:32 Dose:  20 mg


Tramadol HCl (Ultram)  50 mg PO Q6H PRN


   PRN Reason: PAIN - MOD TO SEVERE (5-10)











AMI Plan





- Labs


Result Diagrams: 


 08/25/18 05:55





 08/25/18 05:55

## 2018-08-26 LAB
ANION GAP SERPL CALC-SCNC: 10 MMOL/L (ref 7–16)
BUN SERPL-MCNC: 40 MG/DL (ref 8–23)
CO2 SERPL-SCNC: 30 MMOL/L (ref 22–29)
CREAT SERPL-MCNC: 1.5 MG/DL (ref 0.7–1.2)
EST GLOMERULAR FILTRATION RATE: 48 ML/MIN
GLUCOSE SERPL-MCNC: 121 MG/DL (ref 74–109)

## 2018-08-26 RX ADMIN — ASPIRIN TAB DELAYED RELEASE 81 MG SCH MG: 81 TABLET DELAYED RESPONSE at 09:08

## 2018-08-26 RX ADMIN — CARVEDILOL SCH MG: 3.12 TABLET, FILM COATED ORAL at 21:57

## 2018-08-26 RX ADMIN — CARVEDILOL SCH MG: 12.5 TABLET, FILM COATED ORAL at 09:08

## 2018-08-26 RX ADMIN — AMLODIPINE BESYLATE SCH: 5 TABLET ORAL at 13:10

## 2018-08-26 RX ADMIN — ALBUTEROL SULFATE PRN MG: 2.5 SOLUTION RESPIRATORY (INHALATION) at 10:06

## 2018-08-26 RX ADMIN — AZITHROMYCIN MONOHYDRATE SCH: 500 INJECTION, POWDER, LYOPHILIZED, FOR SOLUTION INTRAVENOUS at 13:10

## 2018-08-26 RX ADMIN — FUROSEMIDE SCH MG: 20 TABLET ORAL at 16:35

## 2018-08-26 RX ADMIN — CEFTRIAXONE SODIUM SCH MLS/HR: 1 INJECTION, POWDER, FOR SOLUTION INTRAMUSCULAR; INTRAVENOUS at 13:10

## 2018-08-26 RX ADMIN — ATORVASTATIN CALCIUM SCH MG: 20 TABLET, FILM COATED ORAL at 21:56

## 2018-08-26 RX ADMIN — DONEPEZIL HYDROCHLORIDE SCH MG: 5 TABLET, FILM COATED ORAL at 09:08

## 2018-08-26 RX ADMIN — CARVEDILOL SCH MG: 3.12 TABLET, FILM COATED ORAL at 09:08

## 2018-08-26 RX ADMIN — ISOSORBIDE MONONITRATE SCH: 30 TABLET, FILM COATED, EXTENDED RELEASE ORAL at 13:10

## 2018-08-26 RX ADMIN — RIVAROXABAN SCH MG: 20 TABLET, FILM COATED ORAL at 09:08

## 2018-08-26 RX ADMIN — ALBUTEROL SULFATE PRN MG: 2.5 SOLUTION RESPIRATORY (INHALATION) at 16:03

## 2018-08-26 RX ADMIN — LISINOPRIL SCH MG: 20 TABLET ORAL at 09:08

## 2018-08-26 RX ADMIN — MEMANTINE HYDROCHLORIDE SCH MG: 10 TABLET ORAL at 21:55

## 2018-08-26 RX ADMIN — PANTOPRAZOLE SODIUM SCH MG: 40 TABLET, DELAYED RELEASE ORAL at 06:50

## 2018-08-26 RX ADMIN — ACETAMINOPHEN PRN MG: 500 TABLET ORAL at 21:56

## 2018-08-26 RX ADMIN — AZITHROMYCIN SCH MG: 500 TABLET, FILM COATED ORAL at 13:07

## 2018-08-26 RX ADMIN — MEMANTINE HYDROCHLORIDE SCH MG: 10 TABLET ORAL at 09:08

## 2018-08-26 RX ADMIN — FUROSEMIDE SCH MG: 20 TABLET ORAL at 09:08

## 2018-08-26 NOTE — PHYSICIAN PROGRESS NOTE
Subjective





- Date


Date of Physician Progress Note: 08/26/18





- Subjective


Subjective Comment: 


Patient alert and awake. He is able to get out of bed and ambulate  to restroom 

with assistance. He is maintaining sats > 94% w/o supplemtal oxygen. He has a 

persistent wet cough but is not bringing up any sputum. He denies chest pain or 

shortness of breath. 





Objective





- Vital Signs


Vital Signs: 





 Vital Signs - Last 24 Hrs











  Temp Pulse Pulse Pulse Resp BP BP


 


 08/26/18 10:06   74    18  


 


 08/26/18 09:00     65  18   110/46


 


 08/26/18 08:26    68   20  


 


 08/26/18 07:30  97.8 F    62  20   155/63


 


 08/26/18 05:00  98.1 F    55 L  20  137/53 


 


 08/26/18 00:10  97.6 F    64  20  149/66 


 


 08/25/18 21:00    68    


 


 08/25/18 17:00  99 F   68   20  126/59 


 


 08/25/18 13:00  99.1 F   64  64  24  110/56 














  Pulse Ox


 


 08/26/18 10:06  98


 


 08/26/18 09:00  97


 


 08/26/18 08:26 


 


 08/26/18 07:30  98


 


 08/26/18 05:00  97


 


 08/26/18 00:10  98


 


 08/25/18 21:00 


 


 08/25/18 17:00  95


 


 08/25/18 13:00  93 L














- General


General Appearance: Alert, Oriented x3, Cooperative, No acute distress


Limitations: No limitations





- Head


Head exam: Atraumatic, Normocephalic, Normal inspection





- Eye


Eye exam: Normal appearance





- ENT


Throat exam: Normal inspection.  negative: Tonsillar erythema, Tonsillar exudate





- Neck


Neck exam: Normal inspection, Full ROM.  negative: Tenderness





- Respiratory


Respiratory exam: Decreased breath sounds (on the left ), Rales (no rales this 

morning ), Other.  negative: Respiratory distress





- Cardiovascular


Cardiovascular Exam: Regular rate, Normal rhythm, Normal heart sounds


Peripheral Pulses: 2+: Radial (R), Radial (L), Dorsalis Pedis (R), Dorsalis 

Pedis (L)





- GI/Abdominal


GI/Abdominal exam: Soft, Normal bowel sounds.  negative: Tenderness





- Extremities


Extremities exam: Normal inspection, Full ROM, Normal capillary refill.  

negative: Tenderness





- Back


Back exam: Reports: Normal inspection





- Neurological


Neurological exam: Alert, Oriented X3.  negative: Altered, Motor sensory deficit





- Skin


Skin exam: negative: Rash





Assessment and Plan





- Assessment and Plan


(1) Pneumonia of both lower lobes


Current Visit: Yes   Status: Acute   Base Code: J18.9 - PNEUMONIA, UNSPECIFIED 

ORGANISM   Comment: 





8/26/18: 


- CXR 8/25: opacities of bilateral lower lung lobes. 


- Recephin 1 gm Q24H, Zithromax 500mg Q24H 


- Albuterol nebs Q6H ORN


- Oxygen to maintain sats > 92%    





(2) Acute exacerbation of congestive heart failure


Current Visit: Yes   Status: Acute   Base Code: I50.9 - HEART FAILURE, 

UNSPECIFIED   Comment: 


8/26/18:


- CXR: evidence of congestion, BNP > 5000


- IV Lasix 20mg BID, resume Coreg 25mg and Coreg 6.25mg BID?, Imdur 30mg held, 

and Lisinopril 20mg daily,Atorvastatin 10mg, ASA 81mg.


- repeat basic metabolic panel, daily weights, I/Os 


- continuous cardiac monitoring   





(3) Acute on chronic renal insufficiency


Current Visit: Yes   Status: Acute   Base Code: N28.9 - DISORDER OF KIDNEY AND 

URETER, UNSPECIFIED; N18.9 - CHRONIC KIDNEY DISEASE, UNSPECIFIED   Comment: 


8/26/18:


- Bun/Cr 37/1.6, --> 40/1.5 


- Repeat electrolytes in the morning


- Lasix 20mg BID to PO BID daily.    





(4) HTN (hypertension)


Current Visit: Yes   Status: Acute   Base Code: I10 - ESSENTIAL (PRIMARY) 

HYPERTENSION   Comment: 


8/26/18:


- /46


- resume Amlodipine 2.5mg, Lisinopril 20mg daily. Hold BP medications.    





(5) CAD (coronary artery disease)


Current Visit: Yes   Status: Acute   Base Code: I25.10 - ATHSCL HEART DISEASE 

OF NATIVE CORONARY ARTERY W/O ANG PCTRS   Comment: 


8/26/18:


- old LBBB, Qtc prolongation, NSR, hx of CABG x 3 


- resumes ASA, Coreg, Lisinopril, Atorvastatin. 


- cont cardiac monitor    





(6) DVT prophylaxis


Current Visit: Yes   Status: Acute   Base Code: [id number] -    Comment: 


8/26/18:


- pt on Xarelto but we are unclear if he has a fib, or hx of dvt. There is no 

documentation of either of these conditions.    





(7) DNR (do not resuscitate)


Current Visit: Yes   Status: Acute   Base Code: Z66 - DO NOT RESUSCITATE   

Comment: 


8/26/18:


- pt is DNR on this admission.   





Results





- Labs


Result Diagrams: 


 08/25/18 05:55





 08/26/18 06:10


Labs Last 24 Hours: 





 Laboratory Results - last 24 hr











  08/26/18





  06:10


 


Sodium  140


 


Potassium  4.4


 


Chloride  100


 


Carbon Dioxide  30.0 H


 


Anion Gap  10.0


 


BUN  40 H


 


Creatinine  1.5 H


 


Estimated GFR  48


 


Random Glucose  121 H


 


Calcium  8.8














DVT/PE Assessment





- Risk for VTE


Risk for VTE: No


Risk Level: High


Risk Assessment Date: 08/24/18


Risk Assessment Time: 09:57


VTE Orders Placed or Will Be Placed: Yes





- Active Medicaitons


Current Medications: 





 Current Medications





Acetaminophen (Tylenol 500mg Tab)  500 mg PO Q6H PRN


   PRN Reason: PAIN - MILD(1-4)/FEVER


   Last Admin: 08/25/18 22:09 Dose:  500 mg


Albuterol Sulfate ()  2.5 mg INH RESP.Q6H.WA PRN


   PRN Reason: DIFFICULTY IN BREATHING


   Last Admin: 08/26/18 10:06 Dose:  2.5 mg


Amlodipine Besylate (Norvasc)  2.5 mg PO DAILY [facility]


   Last Admin: 08/25/18 09:37 Dose:  2.5 mg


Aspirin (Ecotrin (Ec))  81 mg PO DAILY [facility]


   Last Admin: 08/26/18 09:08 Dose:  81 mg


Atorvastatin Calcium (Lipitor)  10 mg PO QHS [facility]


   Last Admin: 08/25/18 22:10 Dose:  10 mg


Carvedilol (Coreg)  25 mg PO DAILY [facility]


   Last Admin: 08/26/18 09:08 Dose:  25 mg


Carvedilol (Coreg)  6.25 mg PO BID [facility]


   Last Admin: 08/26/18 09:08 Dose:  6.25 mg


Donepezil HCl (Aricept)  5 mg PO DAILY [facility]


   Last Admin: 08/26/18 09:08 Dose:  5 mg


Furosemide (Lasix)  20 mg PO BIDDIUR [facility]


   Last Admin: 08/26/18 09:08 Dose:  20 mg


Azithromycin 500 mg/ Sodium (Chloride)  250 mls @ 250 mls/hr IVPB Q24H [facility]


   Stop: 08/31/18 11:01


Ceftriaxone Sodium 1 gm/ (Sodium Chloride)  100 mls @ 200 mls/hr IVPB Q24H [facility]


   Stop: 08/31/18 10:01


Isosorbide Mononitrate (Imdur)  30 mg PO DAILY [facility]


   Last Admin: 08/25/18 09:36 Dose:  30 mg


Lisinopril (Zestril)  20 mg PO DAILY [facility]


   Last Admin: 08/26/18 09:08 Dose:  20 mg


Memantine (Namenda)  10 mg PO BID [facility]


   Last Admin: 08/26/18 09:08 Dose:  10 mg


Nitroglycerin (Nitrostat 0.4mg)  0.4 mg SL Q5MIN PRN


   PRN Reason: CHEST PAIN


[facility]


   Last Admin: 08/26/18 06:50 Dose:  40 mg


Rivaroxaban (Xarelto)  20 mg PO DAILY [facility]


   Last Admin: 08/26/18 09:08 Dose:  20 mg


Tramadol HCl (Ultram)  50 mg PO Q6H PRN


   PRN Reason: PAIN - MOD TO SEVERE (5-10)











AMI Plan





- Labs


Result Diagrams: 


 08/25/18 05:55





 08/26/18 06:10

## 2018-08-26 NOTE — DISCHARGE SUMMARY
Providers


Date of admission: 


08/24/18 02:23





Attending physician: 


MARIANGEL FAIRBANKS





Primary care physician: 


Yossi Michael








Physical Exam





- Vital Signs


Vital Signs: 


 Vital Signs - Last 24 Hrs











  Temp Pulse Pulse Resp BP BP Pulse Ox


 


 08/26/18 09:00    65  18   110/46  97


 


 08/26/18 08:26   68   20   


 


 08/26/18 07:30  97.8 F   62  20   155/63  98


 


 08/26/18 05:00  98.1 F   55 L  20  137/53   97


 


 08/26/18 00:10  97.6 F   64  20  149/66   98


 


 08/25/18 21:00   68     


 


 08/25/18 17:00  99 F  68   20  126/59   95


 


 08/25/18 13:00  99.1 F  64  64  24  110/56   93 L


 


 08/25/18 10:42     18    92 L


 


 08/25/18 10:36     18    96














- General


General Appearance: Alert, Oriented x3, Cooperative, No acute distress


Limitations: No limitations





- Head


Head exam: Atraumatic, Normocephalic, Normal inspection





- Eye


Eye exam: Normal appearance





- ENT


Throat exam: Normal inspection.  negative: Tonsillar erythema, Tonsillar exudate





- Neck


Neck exam: Normal inspection, Full ROM.  negative: Tenderness





- Respiratory


Respiratory exam: Decreased breath sounds (on the left ), Rales (no rales this 

morning ), Other.  negative: Respiratory distress





- Cardiovascular


Cardiovascular Exam: Regular rate, Normal rhythm, Normal heart sounds


Peripheral Pulses: 2+: Radial (R), Radial (L), Dorsalis Pedis (R), Dorsalis 

Pedis (L)





- GI/Abdominal


GI/Abdominal exam: Soft, Normal bowel sounds.  negative: Tenderness





- Extremities


Extremities exam: Normal inspection, Full ROM, Normal capillary refill.  

negative: Tenderness





- Back


Back exam: Reports: Normal inspection





- Neurological


Neurological exam: Alert, Oriented X3.  negative: Altered, Motor sensory deficit





- Skin


Skin exam: negative: Rash





Hospitalization





- Hospitalization


Admission Diagnosis: 1. CHF





- Problem List/Discharge Diagnosis


(1) Acute on chronic renal insufficiency


Current Visit: Yes   Status: Acute   Base Code: N28.9 - DISORDER OF KIDNEY AND 

URETER, UNSPECIFIED; N18.9 - CHRONIC KIDNEY DISEASE, UNSPECIFIED   Comment: 


8/25/18:


- Bun/Cr 37/1.6, possibly due to diuresis. 


- Repeat electrolytes in the morning


- Lasix 20mg BID to PO BID daily.    





(2) Acute exacerbation of congestive heart failure


Current Visit: Yes   Status: Acute   Base Code: I50.9 - HEART FAILURE, 

UNSPECIFIED   Comment: 


8/25/18:


- CXR: evidence of congestion, BNP > 5000


- IV Lasix 20mg BID, resume Coreg 25mg and Coreg 6.25mg BID?, Imdur 30mg, and 

Lisinopril 20mg daily,Atorvastatin 10mg, ASA 81mg.


- repeat basic metabolic panel, daily weights, I/Os 


- continuous cardiac monitoring   





(3) HTN (hypertension)


Current Visit: Yes   Status: Acute   Base Code: I10 - ESSENTIAL (PRIMARY) 

HYPERTENSION   Comment: 


8/25/18:


- /88, HR 68


- resume Amlodipine 2.5mg, Lisinopril 20mg daily.    





(4) CAD (coronary artery disease)


Current Visit: Yes   Status: Acute   Base Code: I25.10 - ATHSCL HEART DISEASE 

OF NATIVE CORONARY ARTERY W/O ANG PCTRS   Comment: 


8/25/18:


- old LBBB, Qtc prolongation, NSR, hx of CABG x 3 


- resumes ASA, Coreg, Lisinopril, Atorvastatin. 


- cont cardiac monitor    





(5) DVT prophylaxis


Current Visit: Yes   Status: Acute   Base Code: [id number] -    Comment: 


8/25/18:


- pt on Xarelto but we are unclear if he has a fib, or hx of dvt. There is no 

documentation of either of these conditions.    





(6) DNR (do not resuscitate)


Current Visit: Yes   Status: Acute   Base Code: Z66 - DO NOT RESUSCITATE   

Comment: 


8/25/18:


- pt is DNR on this admission.   





- Disposition


D/C back to [facility] assisted [facility]. Follow up with Dr. Michael within the 

week. 





- Hospitalization Course


Procedures: 


Imaging and X-Rays





08/24/18 01:12


CHEST 2 VIEWS [RAD] Stat 





08/25/18 16:25


CHEST 2 VIEWS [RAD] Stat 








Cardiology Procedures





08/24/18 01:12


Cardiac Monitor NOW 


EKG NOW 





08/24/18 02:39


Cardiac Monitor .Continuous 


EKG QDX2@0600 











Abnormal Labs: 


 Abnormal Lab Results











  08/24/18 08/24/18 08/24/18 Range/Units





  00:34 00:34 00:34 


 


Hgb  12.1 L    (14.0-18.0)  gm/dl


 


Hct  39.8 L    (42.0-52.0)  %


 


MCV     (81-97)  fl


 


MCH  24.6 L    (27-33)  pg


 


MCHC  30.4 L    (32-36)  g/dl


 


RDW  14.7 H    (11.5-14.5)  %


 


Lymphocytes %  14.8 L    (16-45)  %


 


Monocytes %     (0-9)  %


 


PT   14.5 H   (9.5-12.1)  SECONDS


 


APTT   40.5 H   (24.5-39.1)  SECONDS


 


Carbon Dioxide    31.0 H  (22-29)  mmol/L


 


BUN    24 H  (8-23)  mg/dL


 


Creatinine    1.3 H  (0.7-1.2)  mg/dL


 


Random Glucose    136 H  ()  mg/dL


 


Creatine Kinase    31 L  ()  U/L


 


Troponin T     (0-0.010)  ng/mL


 


NT-Pro-B Natriuret Pep    5047.00 H  (<450)  pg/mL














  08/24/18 08/25/18 08/25/18 Range/Units





  14:12 05:55 05:55 


 


Hgb   10.8 L   (14.0-18.0)  gm/dl


 


Hct   35.8 L   (42.0-52.0)  %


 


MCV   80.8 L   (81-97)  fl


 


MCH   24.3 L   (27-33)  pg


 


MCHC   30.2 L   (32-36)  g/dl


 


RDW   14.8 H   (11.5-14.5)  %


 


Lymphocytes %     (16-45)  %


 


Monocytes %   10.1 H   (0-9)  %


 


PT     (9.5-12.1)  SECONDS


 


APTT     (24.5-39.1)  SECONDS


 


Carbon Dioxide    30.0 H  (22-29)  mmol/L


 


BUN    37 H  (8-23)  mg/dL


 


Creatinine    1.6 H  (0.7-1.2)  mg/dL


 


Random Glucose    114 H  ()  mg/dL


 


Creatine Kinase     ()  U/L


 


Troponin T  0.015 H    (0-0.010)  ng/mL


 


NT-Pro-B Natriuret Pep     (<450)  pg/mL














  08/26/18 Range/Units





  06:10 


 


Hgb   (14.0-18.0)  gm/dl


 


Hct   (42.0-52.0)  %


 


MCV   (81-97)  fl


 


MCH   (27-33)  pg


 


MCHC   (32-36)  g/dl


 


RDW   (11.5-14.5)  %


 


Lymphocytes %   (16-45)  %


 


Monocytes %   (0-9)  %


 


PT   (9.5-12.1)  SECONDS


 


APTT   (24.5-39.1)  SECONDS


 


Carbon Dioxide  30.0 H  (22-29)  mmol/L


 


BUN  40 H  (8-23)  mg/dL


 


Creatinine  1.5 H  (0.7-1.2)  mg/dL


 


Random Glucose  121 H  ()  mg/dL


 


Creatine Kinase   ()  U/L


 


Troponin T   (0-0.010)  ng/mL


 


NT-Pro-B Natriuret Pep   (<450)  pg/mL











Condition at Discharge: (2) Stable





Discharge Medications





- Discharge Medications


Home Medications: 


 Ambulatory Orders





Amlodipine Besylate [Norvasc] 2.5 mg PO DAILY 08/24/18 [Last Taken Unknown]


Aspirin [Aspir-Low] 81 mg PO DAILY 08/24/18 [Last Taken Unknown]


Atorvastatin Calcium [Lipitor] 10 mg PO QHS 08/24/18 [Last Taken Unknown]


Carvedilol 25 mg PO DAILY 08/24/18 [Last Taken Unknown]


Carvedilol [Coreg] 6.25 mg PO BID 08/24/18 [Last Taken Unknown]


Donepezil HCl [Aricept] 5 mg PO DAILY 08/24/18 [Last Taken Unknown]


Furosemide [Lasix] 20 mg PO DAILY 08/24/18 [Last Taken Unknown]


Isosorbide Mononitrate [Imdur] 30 mg PO DAILY 08/24/18 [Last Taken Unknown]


Lisinopril 20 mg PO DAILY 08/24/18 [Last Taken Unknown]


Memantine HCl [Namenda] 10 mg PO BID 08/24/18 [Last Taken Unknown]


Multivit-Min/FA/Lycopen/Lutein [Centrum Silver Men Tablet] 1 each PO DAILY 08/24 /18 [Last Taken Unknown]


Omeprazole 20 mg PO DAILY 08/24/18 [Last Taken Unknown]


Rivaroxaban [Xarelto] 20 mg PO DAILY 08/24/18 [Last Taken Unknown]


Tramadol HCl 50 mg PO Q6H PRN 08/24/18 [Last Taken Unknown]











Discharge Plan





- Discharge Instructions


Instructions:  Heart Failure (ED)





Quality Measures





- Quality Measures


Quality Measures: Advance Directives, Coronary Artery Disease: Antiplatelet 

Therapy, Documentation of Current Medications in Medical Record, Elder 

Maltreatment Screen and Follow-Up Plan, Heart Failure, Screening for High Blood 

Pressure and F/U Documented





- Current Medications


Quality Measure: Measure #130: Documentation of Current Medications





- Blood Pressure Screening


Quality Measure: Screening for High Blood Pressure and Follow-Up Documented


Blood Pressure Classification: Pre-Hypertensive BP Reading


Systolic Measurement: 144


Diastolic Measurement: 80


Screening for High Blood Pressure: < Pre-Hypertensive BP, F/U Documented > [

]





- Coronary Artery Disease


Quality Measure: Measure #6: Coronary Artery Disease (CAD)





- Heart Failure (ACE/ARB Therapy)


Quality Measure: Heart Failure





- Heart Failure (Beta-blocker Therapy)


Quality Measure: Heart Failure





- Advance Directives


Quality Measure: Measure #47: Care Plan


Advance Directives Established: No


Advance Directives Information Provided To Patient: No


Advance Directives on File: No


Living Will: No (unknown)


Power of : No (unknown)





- Elder Abuse Suspicion Index


Screening: Elder Abuse Suspicion Index Screening


Rely on people for bathing, dressing, shopping, banking, etc: Yes


Prevented from getting food, clothes, medication, etc: No


Made to feel shamed or threatened by someone: No


Forced to sign papers or use money against will: No


Feel afraid, touched in ways not wanted or hurt physically: No


Poor eye contact, withdrawn, malnourished, cuts or bruises: No


Screening Result: Negative result


E[employer]I Reference Information: Nando ROBERTSON, Heather C, Turner D, Ruddy GU.Development and validation of a tool to assist physicians identification of 

elder abuse: The Elder Abuse Suspicion  Index (EASI ).  Journal of Elder Abuse 

and Neglect, 2008; 20 (3): 276-300.





- Elder Maltreatment Screen


Quality Measures: Elder Maltreatment Screen and Follow-Up Plan


Elder Maltreatment Screen: <Negative, No Follow-Up Plan Required> []

## 2018-08-27 LAB
ANION GAP SERPL CALC-SCNC: 11 MMOL/L (ref 7–16)
BASOPHILS NFR BLD: 0.4 % (ref 0–6)
BUN SERPL-MCNC: 31 MG/DL (ref 8–23)
CO2 SERPL-SCNC: 30 MMOL/L (ref 22–29)
CREAT SERPL-MCNC: 1.4 MG/DL (ref 0.7–1.2)
EOSINOPHIL NFR BLD: 2.8 % (ref 0–6)
ERYTHROCYTE [DISTWIDTH] IN BLOOD BY AUTOMATED COUNT: 14.8 % (ref 11.5–14.5)
EST GLOMERULAR FILTRATION RATE: 51 ML/MIN
GLUCOSE SERPL-MCNC: 114 MG/DL (ref 74–109)
GRANULOCYTES NFR BLD: 77.7 % (ref 47–80)
HCT VFR BLD CALC: 38 % (ref 42–52)
HGB BLD-MCNC: 11.4 GM/DL (ref 14–18)
LYMPHOCYTES NFR BLD AUTO: 10.8 % (ref 16–45)
MCH RBC QN AUTO: 24.3 PG (ref 27–33)
MCHC RBC AUTO-ENTMCNC: 30 G/DL (ref 32–36)
MCV RBC AUTO: 81.2 FL (ref 81–97)
MONOCYTES NFR BLD: 8.3 % (ref 0–9)
PLATELET # BLD: 180 K/UL (ref 130–400)
PMV BLD AUTO: 10 FL (ref 7.4–10.4)
RBC # BLD AUTO: 4.68 M/UL (ref 4.4–5.7)
WBC # BLD AUTO: 11.2 K/UL (ref 4.2–12.2)

## 2018-08-27 RX ADMIN — MEMANTINE HYDROCHLORIDE SCH MG: 10 TABLET ORAL at 10:06

## 2018-08-27 RX ADMIN — CARVEDILOL SCH: 12.5 TABLET, FILM COATED ORAL at 10:08

## 2018-08-27 RX ADMIN — CARVEDILOL SCH: 3.12 TABLET, FILM COATED ORAL at 10:08

## 2018-08-27 RX ADMIN — LISINOPRIL SCH MG: 20 TABLET ORAL at 10:06

## 2018-08-27 RX ADMIN — PANTOPRAZOLE SODIUM SCH MG: 40 TABLET, DELAYED RELEASE ORAL at 07:00

## 2018-08-27 RX ADMIN — AZITHROMYCIN SCH MG: 500 TABLET, FILM COATED ORAL at 10:07

## 2018-08-27 RX ADMIN — FUROSEMIDE SCH MG: 20 TABLET ORAL at 10:06

## 2018-08-27 RX ADMIN — DONEPEZIL HYDROCHLORIDE SCH MG: 5 TABLET, FILM COATED ORAL at 10:07

## 2018-08-27 RX ADMIN — AZITHROMYCIN MONOHYDRATE SCH MLS/HR: 500 INJECTION, POWDER, LYOPHILIZED, FOR SOLUTION INTRAVENOUS at 11:52

## 2018-08-27 RX ADMIN — ASPIRIN TAB DELAYED RELEASE 81 MG SCH MG: 81 TABLET DELAYED RESPONSE at 10:07

## 2018-08-27 RX ADMIN — RIVAROXABAN SCH MG: 20 TABLET, FILM COATED ORAL at 10:07

## 2018-08-27 RX ADMIN — AMLODIPINE BESYLATE SCH: 5 TABLET ORAL at 10:07

## 2018-08-27 RX ADMIN — CEFTRIAXONE SODIUM SCH MLS/HR: 1 INJECTION, POWDER, FOR SOLUTION INTRAMUSCULAR; INTRAVENOUS at 09:57

## 2018-08-27 RX ADMIN — ISOSORBIDE MONONITRATE SCH: 30 TABLET, FILM COATED, EXTENDED RELEASE ORAL at 10:08

## 2018-08-27 NOTE — DISCHARGE SUMMARY
Providers


Discharge Summary Date: 08/27/18


Date of admission: 


08/24/18 02:23





Attending physician: 


MARIANGEL FAIRBANKS





Primary care physician: 


Yossi Michael








Physical Exam





- Vital Signs


Vital Signs: 


 Vital Signs - Last 24 Hrs











  Temp Pulse Pulse Resp BP BP Pulse Ox


 


 08/27/18 09:00  98.8 F   67  20   162/78  97


 


 08/27/18 05:00  98.0 F   70  18  144/70   95


 


 08/26/18 20:00  98.1 F   66  20   143/71  98


 


 08/26/18 17:00  98.4 F   67  18   133/66  97


 


 08/26/18 16:07   60   18    95


 


 08/26/18 16:03   61   18    98


 


 08/26/18 13:00  98.4 F   68  18   121/63  97














- General


General Appearance: Alert, Oriented x3, Cooperative, No acute distress


Limitations: No limitations





- Head


Head exam: Atraumatic, Normocephalic, Normal inspection





- Eye


Eye exam: Normal appearance





- ENT


Throat exam: Normal inspection.  negative: Tonsillar erythema, Tonsillar exudate





- Neck


Neck exam: Normal inspection, Full ROM.  negative: Tenderness





- Respiratory


Respiratory exam: Decreased breath sounds (on the left ), Rales (no rales this 

morning ), Other (patient has upper airway bronchial sounds. ).  negative: 

Respiratory distress





- Cardiovascular


Cardiovascular Exam: Regular rate, Normal rhythm, Normal heart sounds


Peripheral Pulses: 2+: Radial (R), Radial (L), Dorsalis Pedis (R), Dorsalis 

Pedis (L)





- GI/Abdominal


GI/Abdominal exam: Soft, Normal bowel sounds.  negative: Tenderness





- Extremities


Extremities exam: Normal inspection, Full ROM, Normal capillary refill.  

negative: Tenderness





- Back


Back exam: Reports: Normal inspection





- Neurological


Neurological exam: Alert, Oriented X3.  negative: Altered, Motor sensory deficit





- Skin


Skin exam: negative: Rash





Hospitalization





- Hospitalization


Admission Diagnosis: 1. CHF





- Problem List/Discharge Diagnosis


(1) Pneumonia of both lower lobes


Current Visit: Yes   Status: Acute   Base Code: J18.9 - PNEUMONIA, UNSPECIFIED 

ORGANISM   Comment: 





8/27/18: 


- CXR 8/25: opacities of bilateral lower lung lobes. 


- Recephin 1 gm Q24H, Zithromax 500mg Q24H, WBCs stable, afebrile. 


- Change to Cefdinir 300mg Q12H and Zithromax 500mg Q24H 


- Albuterol nebs Q6H ORN


- Oxygen to maintain sats > 92%    





(2) Acute exacerbation of congestive heart failure


Current Visit: Yes   Status: Acute   Base Code: I50.9 - HEART FAILURE, 

UNSPECIFIED   Comment: 


8/27/18:


- CXR: evidence of congestion, BNP > 5000 - resolved 


- IV Lasix 20mg BID, resume Coreg 25mg and Coreg 6.25mg BID?, Imdur 30mg held, 

and Lisinopril 20mg daily,Atorvastatin 10mg, ASA 81mg.


- repeat basic metabolic panel, daily weights, I/Os 


- continuous cardiac monitoring   





(3) Acute on chronic renal insufficiency


Current Visit: Yes   Status: Acute   Base Code: N28.9 - DISORDER OF KIDNEY AND 

URETER, UNSPECIFIED; N18.9 - CHRONIC KIDNEY DISEASE, UNSPECIFIED   Comment: 


8/27/18:


- Bun/Cr 37/1.6, --> 40/1.5 --> 31/1.4   





(4) HTN (hypertension)


Current Visit: Yes   Status: Acute   Base Code: I10 - ESSENTIAL (PRIMARY) 

HYPERTENSION   Comment: 


8/27/18:


- hold amlodipine and Coreg. Give Lisinopril 20mg 


- resume Amlodipine 2.5mg, Lisinopril 20mg daily.   





(5) CAD (coronary artery disease)


Current Visit: Yes   Status: Acute   Base Code: I25.10 - ATHSCL HEART DISEASE 

OF NATIVE CORONARY ARTERY W/O ANG PCTRS   Comment: 


8/27/18:


- old LBBB, Qtc prolongation, NSR, hx of CABG x 3 


- resumes ASA, Coreg, Lisinopril, Atorvastatin. 


- cont cardiac monitor    





(6) DVT prophylaxis


Current Visit: Yes   Status: Acute   Base Code: VON1965 -    Comment: 


8/27/18:


- pt on Xarelto but we are unclear if he has a fib, or hx of dvt. There is no 

documentation of either of these conditions.    





(7) DNR (do not resuscitate)


Current Visit: Yes   Status: Acute   Base Code: Z66 - DO NOT RESUSCITATE   

Comment: 


8/27/18:


- pt is DNR on this admission.   





- Hospitalization Course


Hospital Course: 


Mr. Edouard is a 82 y/o male [facility] assisted living resident with history 

of congestive heart failure who presents with complaint of increasing shortness 

of breath and persistent cough over the past week. The patient reports that he 

then began to have chest pain but only when he coughs. He says that he becomes 

short of breath when he climbs stairs but can walk around using his walker 

without becoming winded. The patient reports compliance with his medications 

and has not been eating excessive amounts of salty foods or had an increase in 

fluid intake. He denies radiation of his pain or palpitations. He has a history 

of coronary artery disease with CABG x 3, CVA, seizures and malignant neoplasm 

of the esophagus. 





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 

PACs throughout. Chest xray shows evidence of congestion and his BNP > 5000. 

The patient is admitted for IV diuresis and optimization of heart failure 

medication. 





8/25-8/26: The patient's acute heart failure resolved however chest xray read 

lower lung lobe pneumonia. He was started on Rocephin 1gm daily and 

Azithromycin 500mg daily. He has no had any mucus production, fevers or 

worsening of his respiratory status. The patient's kidney function did appear 

to be acute on chronic, and trended down to what appears to be his baseline 

function. The patient has a reported allergy to cephalosporins but not reaction 

was noted upon administration of Rocephin. Will discharge on Cefdinir and 

Azithromycin for seven days.  





8/27: The patient is evaluated this morning at bedside and he is awake and 

alert with no respiratory distress. He is able to ambulate to the restroom with 

assistance and maintain saturations in the low 90s. Vitals this morning: /

78--. 136/59, HR 67, RR 18, Sats 97% 2 liters, T 98.9. His renal function 

appears to have stabilized with eGFR suggestive of CKDIII. He is stable for 

return to [facility] assisted [facility]. 








PCP: Dr. iMchael  


Procedures: 


Imaging and X-Rays





08/24/18 01:12


CHEST 2 VIEWS [RAD] Stat 





08/25/18 16:25


CHEST 2 VIEWS [RAD] Stat 








Cardiology Procedures





08/24/18 01:12


Cardiac Monitor NOW 


EKG NOW 





08/24/18 02:39


Cardiac Monitor .Continuous 


EKG QDX2@0600 











Abnormal Labs: 


 Abnormal Lab Results











  08/24/18 08/24/18 08/24/18 Range/Units





  00:34 00:34 00:34 


 


Hgb  12.1 L    (14.0-18.0)  gm/dl


 


Hct  39.8 L    (42.0-52.0)  %


 


MCV     (81-97)  fl


 


MCH  24.6 L    (27-33)  pg


 


MCHC  30.4 L    (32-36)  g/dl


 


RDW  14.7 H    (11.5-14.5)  %


 


Lymphocytes %  14.8 L    (16-45)  %


 


Monocytes %     (0-9)  %


 


PT   14.5 H   (9.5-12.1)  SECONDS


 


APTT   40.5 H   (24.5-39.1)  SECONDS


 


Carbon Dioxide    31.0 H  (22-29)  mmol/L


 


BUN    24 H  (8-23)  mg/dL


 


Creatinine    1.3 H  (0.7-1.2)  mg/dL


 


Random Glucose    136 H  ()  mg/dL


 


Creatine Kinase    31 L  ()  U/L


 


Troponin T     (0-0.010)  ng/mL


 


NT-Pro-B Natriuret Pep    5047.00 H  (<450)  pg/mL














  08/24/18 08/25/18 08/25/18 Range/Units





  14:12 05:55 05:55 


 


Hgb   10.8 L   (14.0-18.0)  gm/dl


 


Hct   35.8 L   (42.0-52.0)  %


 


MCV   80.8 L   (81-97)  fl


 


MCH   24.3 L   (27-33)  pg


 


MCHC   30.2 L   (32-36)  g/dl


 


RDW   14.8 H   (11.5-14.5)  %


 


Lymphocytes %     (16-45)  %


 


Monocytes %   10.1 H   (0-9)  %


 


PT     (9.5-12.1)  SECONDS


 


APTT     (24.5-39.1)  SECONDS


 


Carbon Dioxide    30.0 H  (22-29)  mmol/L


 


BUN    37 H  (8-23)  mg/dL


 


Creatinine    1.6 H  (0.7-1.2)  mg/dL


 


Random Glucose    114 H  ()  mg/dL


 


Creatine Kinase     ()  U/L


 


Troponin T  0.015 H    (0-0.010)  ng/mL


 


NT-Pro-B Natriuret Pep     (<450)  pg/mL














  08/26/18 08/27/18 08/27/18 Range/Units





  06:10 06:26 06:26 


 


Hgb   11.4 L   (14.0-18.0)  gm/dl


 


Hct   38.0 L   (42.0-52.0)  %


 


MCV     (81-97)  fl


 


MCH   24.3 L   (27-33)  pg


 


MCHC   30.0 L   (32-36)  g/dl


 


RDW   14.8 H   (11.5-14.5)  %


 


Lymphocytes %   10.8 L   (16-45)  %


 


Monocytes %     (0-9)  %


 


PT     (9.5-12.1)  SECONDS


 


APTT     (24.5-39.1)  SECONDS


 


Carbon Dioxide  30.0 H   30.0 H  (22-29)  mmol/L


 


BUN  40 H   31 H  (8-23)  mg/dL


 


Creatinine  1.5 H   1.4 H  (0.7-1.2)  mg/dL


 


Random Glucose  121 H   114 H  ()  mg/dL


 


Creatine Kinase     ()  U/L


 


Troponin T     (0-0.010)  ng/mL


 


NT-Pro-B Natriuret Pep     (<450)  pg/mL











Condition at Discharge: (2) Stable





Discharge Medications





- Discharge Medications


Prescriptions: 


Azithromycin [Zithromax] 500 mg PO DAILY 7 Days #7 tab


Cefdinir [Omnicef] 300 mg PO BID 7 Days #14 cap


Home Medications: 


 Ambulatory Orders





Amlodipine Besylate [Norvasc] 2.5 mg PO DAILY 08/24/18 [Last Taken Unknown]


Aspirin [Aspir-Low] 81 mg PO DAILY 08/24/18 [Last Taken Unknown]


Atorvastatin Calcium [Lipitor] 10 mg PO QHS 08/24/18 [Last Taken Unknown]


Carvedilol 25 mg PO DAILY 08/24/18 [Last Taken Unknown]


Carvedilol [Coreg] 6.25 mg PO BID 08/24/18 [Last Taken Unknown]


Donepezil HCl [Aricept] 5 mg PO DAILY 08/24/18 [Last Taken Unknown]


Furosemide [Lasix] 20 mg PO DAILY 08/24/18 [Last Taken Unknown]


Isosorbide Mononitrate [Imdur] 30 mg PO DAILY 08/24/18 [Last Taken Unknown]


Lisinopril 20 mg PO DAILY 08/24/18 [Last Taken Unknown]


Memantine HCl [Namenda] 10 mg PO BID 08/24/18 [Last Taken Unknown]


Multivit-Min/FA/Lycopen/Lutein [Centrum Silver Men Tablet] 1 each PO DAILY 08/24 /18 [Last Taken Unknown]


Omeprazole 20 mg PO DAILY 08/24/18 [Last Taken Unknown]


Rivaroxaban [Xarelto] 20 mg PO DAILY 08/24/18 [Last Taken Unknown]


Tramadol HCl 50 mg PO Q6H PRN 08/24/18 [Last Taken Unknown]


Azithromycin [Zithromax] 500 mg PO DAILY 7 Days #7 tab 08/27/18 [Last Taken 

Unknown]


Cefdinir [Omnicef] 300 mg PO BID 7 Days #14 cap 08/27/18 [Last Taken Unknown]











Discharge Plan





- Discharge Instructions


Activity at Discharge: Resume Usual Activities As Tolerated


Diet at Discharge: Low Fat, Low Cholesterol, Low Salt Diet


Instructions:  Heart Failure (ED)


Additional Instructions: 


The patient is to resume usual dosing of Lasix 20mg daily for his heart 

failure. 





Two antibiotics are being given for his pneumonia: Cefdinir 300mg twice daily 

and Zithromax 500mg daily for the next seven days. a repat chest xray for 

resolution in 1-2 weeks. Follow up with the patient's PCP  





Quality Measures





- Quality Measures


Quality Measures: Advance Directives, Coronary Artery Disease: Antiplatelet 

Therapy, Documentation of Current Medications in Medical Record, Elder 

Maltreatment Screen and Follow-Up Plan, Heart Failure, Screening for High Blood 

Pressure and F/U Documented





- Current Medications


Quality Measure: Measure #130: Documentation of Current Medications


Documentation of Current Medications: <Current Medications Documented/Reviewed> 

[]





- Blood Pressure Screening


Quality Measure: Screening for High Blood Pressure and Follow-Up Documented


Does Patient Have Any of the Following: Active Dx of HTN


Blood Pressure Classification: Pre-Hypertensive BP Reading


Systolic Measurement: 144


Diastolic Measurement: 80


Screening for High Blood Pressure: Patient Exclusion, Hx of HTN []





- Coronary Artery Disease


Quality Measure: Measure #6: Coronary Artery Disease (CAD)


Antiplatelet Therapy: <ASA or clopidogrel prescribed> [5171F]





- Heart Failure (ACE/ARB Therapy)


Quality Measure: Heart Failure


Left Ventricular Systolic Function: Unknown


ACE Inhibitor or ARB Therapy for LVSD: Not Eligible





- Heart Failure (Beta-blocker Therapy)


Quality Measure: Heart Failure


Left Ventricular Systolic Function: Unknown


Beta-Blocker Therapy for LVEF < 40%: Not Eligible





- Advance Directives


Quality Measure: Measure #47: Care Plan


Advance Directives Established: No


Advance Directives Information Provided To Patient: No


Advance Directives on File: No


Living Will: No (unknown)


Power of : No (unknown)


Advance Care Planning: <Care Plan/Decision Maker Documented; Discussed & 

Documented> [3753F]





- Elder Abuse Suspicion Index


Screening: Elder Abuse Suspicion Index Screening


Rely on people for bathing, dressing, shopping, banking, etc: Yes


Prevented from getting food, clothes, medication, etc: No


Made to feel shamed or threatened by someone: No


Forced to sign papers or use money against will: No


Feel afraid, touched in ways not wanted or hurt physically: No


Poor eye contact, withdrawn, malnourished, cuts or bruises: No


Screening Result: Negative result


EASI Reference Information: Nando ROBERTSON, Heather C, Turner D, Ruddy GU.Development and validation of a tool to assist physicians identification of 

elder abuse: The Elder Abuse Suspicion  Index (EASI ).  Journal of Elder Abuse 

and Neglect, 2008; 20 (3): 276-300.





- Elder Maltreatment Screen


Quality Measures: Elder Maltreatment Screen and Follow-Up Plan


Elder Maltreatment Screen: <Negative, No Follow-Up Plan Required> []

## 2018-08-27 NOTE — RADIOLOGY REPORT
EXAM:  CHEST, TWO VIEWS



HISTORY:  FOLLOW-UP CHF.  



TECHNIQUE:  AP and lateral views of the chest were obtained.  



Comparison:  Two view chest 8/24/18.  



FINDINGS:  Cardiomegaly.  Calcification and torsion of the aorta.  Postop 
sternotomy as before probably with some form of valve prosthesis as before.  
Persistent right pleural effusion blunting the right lateral costophrenic angle 
similar to before.  On the lateral view there is prominent retrocardiac density 
as before which may also be some pleural fluid along the inferior aspect of the 
fissure on the right.  Some persistent streaky atelectasis or infiltrate in the 
mid to lower lungs bilaterally has changed relatively little from yesterday.  
The lungs again appear hyperinflated suggesting underlying COPD.  Multiple 
compression fracture in the thoracic spine as before.  



IMPRESSION:  

1.  CARDIOMEGALY.  



2.  PERSISTENT STREAKY ATELECTASIS OR INFILTRATE IN THE MID TO LOWER LUNGS 
RELATIVELY UNCHANGED FROM YESTERDAY.  



3.  RIGHT PLEURAL EFFUSION PROBABLY WITH FLUID ALONG THE INFERIOR ASPECT OF THE 
MAJOR FISSURE ON THE RIGHT AS SEEN ON THE LATERAL VIEW AS WELL, ALSO UNCHANGED.
  



4.  POSTOP STERNOTOMY WITH VALVE PROSTHESIS AS BEFORE.  



5.  HYPERINFLATION AS BEFORE.  



6.  COMPRESSION FRACTURES THORACIC SPINE AS BEFORE.  



JOB NUMBER:  [id number]
MTDD